# Patient Record
Sex: MALE | Race: WHITE | Employment: FULL TIME | URBAN - METROPOLITAN AREA
[De-identification: names, ages, dates, MRNs, and addresses within clinical notes are randomized per-mention and may not be internally consistent; named-entity substitution may affect disease eponyms.]

---

## 2017-04-07 ENCOUNTER — HOSPITAL ENCOUNTER (OUTPATIENT)
Dept: OTHER | Age: 42
Discharge: HOME | End: 2017-04-07
Attending: FAMILY MEDICINE | Admitting: FAMILY MEDICINE

## 2017-04-07 ENCOUNTER — OFFICE VISIT (OUTPATIENT)
Dept: FAMILY MEDICINE CLINIC | Age: 42
End: 2017-04-07

## 2017-04-07 VITALS
DIASTOLIC BLOOD PRESSURE: 62 MMHG | HEIGHT: 73 IN | OXYGEN SATURATION: 99 % | WEIGHT: 210 LBS | TEMPERATURE: 97.5 F | SYSTOLIC BLOOD PRESSURE: 104 MMHG | BODY MASS INDEX: 27.83 KG/M2 | HEART RATE: 59 BPM

## 2017-04-07 DIAGNOSIS — G47.33 OSA ON CPAP: ICD-10-CM

## 2017-04-07 DIAGNOSIS — Z00.00 WELL ADULT EXAM: Primary | ICD-10-CM

## 2017-04-07 DIAGNOSIS — R07.89 CHEST WALL PAIN: ICD-10-CM

## 2017-04-07 DIAGNOSIS — Z99.89 OSA ON CPAP: ICD-10-CM

## 2017-04-07 DIAGNOSIS — Z13.220 SCREENING FOR LIPID DISORDERS: ICD-10-CM

## 2017-04-07 PROCEDURE — 99395 PREV VISIT EST AGE 18-39: CPT | Performed by: FAMILY MEDICINE

## 2017-04-07 PROCEDURE — 36415 COLL VENOUS BLD VENIPUNCTURE: CPT | Performed by: FAMILY MEDICINE

## 2017-04-07 ASSESSMENT — ENCOUNTER SYMPTOMS
APNEA: 1
EYES NEGATIVE: 1
GASTROINTESTINAL NEGATIVE: 1

## 2017-04-08 LAB
CHOLESTEROL, TOTAL: 165 MG/DL (ref 0–199)
HDLC SERPL-MCNC: 42 MG/DL (ref 40–60)
LDL CHOLESTEROL CALCULATED: 107 MG/DL
TRIGL SERPL-MCNC: 82 MG/DL (ref 0–150)
VLDLC SERPL CALC-MCNC: 16 MG/DL

## 2022-03-14 ENCOUNTER — TELEPHONE (OUTPATIENT)
Dept: FAMILY MEDICINE CLINIC | Age: 47
End: 2022-03-14

## 2022-03-14 DIAGNOSIS — N63.15 BREAST LUMP ON RIGHT SIDE AT 3 O'CLOCK POSITION: Primary | ICD-10-CM

## 2022-03-14 NOTE — TELEPHONE ENCOUNTER
Pt has a lump in his right breast. States that it hurts a little been there for a couple days at least per wife.

## 2022-03-15 NOTE — TELEPHONE ENCOUNTER
Per central scheduling orders needed to be changed to bilateral mammogram and right breast ultra sound.  Orders have been placed

## 2022-03-18 ENCOUNTER — HOSPITAL ENCOUNTER (OUTPATIENT)
Dept: WOMENS IMAGING | Age: 47
Discharge: HOME | End: 2022-03-18
Payer: COMMERCIAL

## 2022-03-18 DIAGNOSIS — N63.15 BREAST LUMP ON RIGHT SIDE AT 3 O'CLOCK POSITION: ICD-10-CM

## 2022-03-18 PROCEDURE — 76642 ULTRASOUND BREAST LIMITED: CPT

## 2022-03-18 PROCEDURE — G0279 TOMOSYNTHESIS, MAMMO: HCPCS

## 2022-03-21 DIAGNOSIS — Z00.00 ANNUAL PHYSICAL EXAM: Primary | ICD-10-CM

## 2022-03-21 DIAGNOSIS — R68.82 LOW LIBIDO: ICD-10-CM

## 2022-03-21 DIAGNOSIS — Z13.220 SCREENING FOR LIPID DISORDERS: ICD-10-CM

## 2022-03-21 DIAGNOSIS — Z12.5 SCREENING FOR PROSTATE CANCER: ICD-10-CM

## 2022-03-24 ENCOUNTER — NURSE ONLY (OUTPATIENT)
Dept: FAMILY MEDICINE CLINIC | Age: 47
End: 2022-03-24
Payer: COMMERCIAL

## 2022-03-24 DIAGNOSIS — Z13.220 SCREENING FOR LIPID DISORDERS: ICD-10-CM

## 2022-03-24 DIAGNOSIS — R68.82 LOW LIBIDO: ICD-10-CM

## 2022-03-24 DIAGNOSIS — Z00.00 ANNUAL PHYSICAL EXAM: ICD-10-CM

## 2022-03-24 DIAGNOSIS — Z12.5 SCREENING FOR PROSTATE CANCER: ICD-10-CM

## 2022-03-24 LAB
ANION GAP SERPL CALCULATED.3IONS-SCNC: 13 MMOL/L (ref 3–16)
BASOPHILS ABSOLUTE: 0 K/UL (ref 0–0.2)
BASOPHILS RELATIVE PERCENT: 0.6 %
BUN BLDV-MCNC: 23 MG/DL (ref 7–20)
CALCIUM SERPL-MCNC: 9.2 MG/DL (ref 8.3–10.6)
CHLORIDE BLD-SCNC: 104 MMOL/L (ref 99–110)
CHOLESTEROL, TOTAL: 148 MG/DL (ref 0–199)
CO2: 22 MMOL/L (ref 21–32)
CREAT SERPL-MCNC: 1 MG/DL (ref 0.9–1.3)
EOSINOPHILS ABSOLUTE: 0.2 K/UL (ref 0–0.6)
EOSINOPHILS RELATIVE PERCENT: 3.5 %
GFR AFRICAN AMERICAN: >60
GFR NON-AFRICAN AMERICAN: >60
GLUCOSE BLD-MCNC: 89 MG/DL (ref 70–99)
HCT VFR BLD CALC: 42 % (ref 40.5–52.5)
HDLC SERPL-MCNC: 34 MG/DL (ref 40–60)
HEMOGLOBIN: 14.5 G/DL (ref 13.5–17.5)
LDL CHOLESTEROL CALCULATED: 95 MG/DL
LYMPHOCYTES ABSOLUTE: 2.2 K/UL (ref 1–5.1)
LYMPHOCYTES RELATIVE PERCENT: 35.3 %
MCH RBC QN AUTO: 29.5 PG (ref 26–34)
MCHC RBC AUTO-ENTMCNC: 34.4 G/DL (ref 31–36)
MCV RBC AUTO: 85.5 FL (ref 80–100)
MONOCYTES ABSOLUTE: 0.5 K/UL (ref 0–1.3)
MONOCYTES RELATIVE PERCENT: 7.3 %
NEUTROPHILS ABSOLUTE: 3.4 K/UL (ref 1.7–7.7)
NEUTROPHILS RELATIVE PERCENT: 53.3 %
PDW BLD-RTO: 13.7 % (ref 12.4–15.4)
PLATELET # BLD: 252 K/UL (ref 135–450)
PMV BLD AUTO: 8.5 FL (ref 5–10.5)
POTASSIUM SERPL-SCNC: 4.2 MMOL/L (ref 3.5–5.1)
PROSTATE SPECIFIC ANTIGEN: 0.92 NG/ML (ref 0–4)
RBC # BLD: 4.9 M/UL (ref 4.2–5.9)
SODIUM BLD-SCNC: 139 MMOL/L (ref 136–145)
TRIGL SERPL-MCNC: 97 MG/DL (ref 0–150)
VLDLC SERPL CALC-MCNC: 19 MG/DL
WBC # BLD: 6.3 K/UL (ref 4–11)

## 2022-03-24 PROCEDURE — 36415 COLL VENOUS BLD VENIPUNCTURE: CPT | Performed by: FAMILY MEDICINE

## 2022-03-28 ENCOUNTER — OFFICE VISIT (OUTPATIENT)
Dept: FAMILY MEDICINE CLINIC | Age: 47
End: 2022-03-28
Payer: COMMERCIAL

## 2022-03-28 VITALS
WEIGHT: 248.2 LBS | HEIGHT: 73 IN | BODY MASS INDEX: 32.89 KG/M2 | OXYGEN SATURATION: 96 % | SYSTOLIC BLOOD PRESSURE: 111 MMHG | DIASTOLIC BLOOD PRESSURE: 72 MMHG | HEART RATE: 71 BPM

## 2022-03-28 DIAGNOSIS — Z85.820 HISTORY OF MELANOMA: ICD-10-CM

## 2022-03-28 DIAGNOSIS — Z00.00 ANNUAL PHYSICAL EXAM: Primary | ICD-10-CM

## 2022-03-28 DIAGNOSIS — D17.1 LIPOMA OF TORSO: ICD-10-CM

## 2022-03-28 PROCEDURE — 99386 PREV VISIT NEW AGE 40-64: CPT | Performed by: FAMILY MEDICINE

## 2022-03-28 SDOH — ECONOMIC STABILITY: FOOD INSECURITY: WITHIN THE PAST 12 MONTHS, THE FOOD YOU BOUGHT JUST DIDN'T LAST AND YOU DIDN'T HAVE MONEY TO GET MORE.: NEVER TRUE

## 2022-03-28 SDOH — ECONOMIC STABILITY: FOOD INSECURITY: WITHIN THE PAST 12 MONTHS, YOU WORRIED THAT YOUR FOOD WOULD RUN OUT BEFORE YOU GOT MONEY TO BUY MORE.: NEVER TRUE

## 2022-03-28 ASSESSMENT — PATIENT HEALTH QUESTIONNAIRE - PHQ9
SUM OF ALL RESPONSES TO PHQ9 QUESTIONS 1 & 2: 0
2. FEELING DOWN, DEPRESSED OR HOPELESS: 0
SUM OF ALL RESPONSES TO PHQ QUESTIONS 1-9: 0
SUM OF ALL RESPONSES TO PHQ QUESTIONS 1-9: 0
1. LITTLE INTEREST OR PLEASURE IN DOING THINGS: 0
SUM OF ALL RESPONSES TO PHQ QUESTIONS 1-9: 0
SUM OF ALL RESPONSES TO PHQ QUESTIONS 1-9: 0

## 2022-03-28 ASSESSMENT — SOCIAL DETERMINANTS OF HEALTH (SDOH): HOW HARD IS IT FOR YOU TO PAY FOR THE VERY BASICS LIKE FOOD, HOUSING, MEDICAL CARE, AND HEATING?: NOT HARD AT ALL

## 2022-03-28 NOTE — PROGRESS NOTES
History and Physical      Genaro Clark  YOB: 1975    Date of Service:  3/28/2022    Chief Complaint:  Genaro Clark is a 42 y.o. male who presents for complete physical examination. HPI:     Patient states that he has not been using his CPAP at night, despite his diagnosis with sleep apnea. He states that he feels like he is sleeping well right now without the CPAP. Discussed with patient his lab results. Patient did have the mammogram completed and it came back as a lipoma. He does not plan to have it removed at this time    He admits to having a history of melanoma. Discussed importance of seeing a dermatologist. Will refer to a dermatologist.      Internal Administration   First Dose COVID-19, J&J, PF, 0.5 mL  06/17/2021   Second Dose           Last COVID Lab No results found for: SARS-COV-2, SARS-COV-2 RNA, SARS-COV-2, SARS-COV-2, SARS-COV-2 BY PCR, SARS-COV-2, SARS-COV-2, SARS-COV-2         [] Patient has completed an advance directive  [x] Patient has NOT completed an advanced directive  [] Patient has a documented healthcare surrogate  [x] Patient does NOT have a documented healthcare surrogate  [] Discussed the importance of establishing and updating an advanced directive. Patient has questions at this time and those were answered. [x] Discussed the importance of establishing and updating an advanced directive. Patient does NOT have questions at this time.     Discussed with: [x] Patient            [] Family             [] Other caregiver    Wt Readings from Last 3 Encounters:   03/28/22 248 lb 3.2 oz (112.6 kg)   04/07/17 210 lb (95.3 kg)   04/27/16 235 lb (106.6 kg)     BP Readings from Last 3 Encounters:   03/28/22 111/72   04/07/17 104/62   04/27/16 118/84     No results found for: LABA1C    Patient Active Problem List   Diagnosis    MIGUEL ANGEL on CPAP       Preventive Care:  Health Maintenance   Topic Date Due    Hepatitis C screen  Never done    Depression Screen  Never done  HIV screen  Never done    COVID-19 Vaccine (2 - Booster for [employer] series) 2021    Lipid screen  2027    DTaP/Tdap/Td vaccine (2 - Td or Tdap) 2031    Flu vaccine  Completed    Hepatitis A vaccine  Aged Out    Hepatitis B vaccine  Aged Out    Hib vaccine  Aged Out    Meningococcal (ACWY) vaccine  Aged Out    Pneumococcal 0-64 years Vaccine  Aged Out        Lipid panel:    Lab Results   Component Value Date    CHOL 148 2022    TRIG 97 2022    HDL 34 (L) 2022    [address] 95 2022       Living will: no,   Advance Care Planning addressed with patient today    Immunization History   Administered Date(s) Administered    COVID-19, J&J, PF, 0.5 mL 2021    Hepatitis B 2021    Influenza Virus Vaccine 2021    Influenza, MDCK Quadv, IM, PF (Flucelvax 2 yrs and older) 2021    Td vaccine (adult) 2009    Td, unspecified formulation 2009    Tdap (Boostrix, Adacel) 2021       No Known Allergies  No outpatient medications have been marked as taking for the 3/28/22 encounter (Office Visit) with Savita Delarosa MD.       Past Medical History:   Diagnosis Date    Melanoma ([facility] [address] 2017    MIGUEL ANGEL on CPAP      Past Surgical History:   Procedure Laterality Date    LIPOMA RESECTION       Family History   Problem Relation Age of Onset    Other Mother         ptca at age 56; 1948;brian bp;   Wilhelminia Blazing Other Father         imp glu jonathan; 1947    No Known Problems Sister      Social History     Socioeconomic History    Marital status:      Spouse name: Not on file    Number of children: Not on file    Years of education: Not on file    Highest education level: Not on file   Occupational History    Not on file   Tobacco Use    Smoking status: Former Smoker     Packs/day: 0.75     Years: 18.00     Pack years: 13.50     Types: Cigarettes     Quit date: 2016     Years since quittin.7    Smokeless tobacco: Never Used Vaping Use    Vaping Use: Never used   Substance and Sexual Activity    Alcohol use: No     Alcohol/week: 0.0 standard drinks    Drug use: No    Sexual activity: Yes     Partners: Female   Other Topics Concern    Not on file   Social History Narrative    4/2017 lives with spouse and 2 children;work for railroad;long hours;needs fmla in 4/2017 for cpap titration     Social Determinants of Health     Financial Resource Strain: Low Risk     Difficulty of Paying Living Expenses: Not hard at all   Food Insecurity: No Food Insecurity    Worried About [email or web address] in the Last Year: Never true    [address]  [employer] in the Last Year: Never true   Transportation Needs:     Lack of Transportation (Medical): Not on file    Lack of Transportation (Non-Medical): Not on file   Physical Activity:     Days of Exercise per Week: Not on file    Minutes of Exercise per Session: Not on file   Stress:     Feeling of Stress : Not on file   Social Connections:     Frequency of Communication with Friends and Family: Not on file    Frequency of Social Gatherings with Friends and Family: Not on file    Attends Druze Services: Not on file    Active Member of [address] [place] [address] or Organizations: Not on file    Attends Club or Organization Meetings: Not on file    Marital Status: Not on file   Intimate Partner Violence:     Fear of Current or Ex-Partner: Not on file    Emotionally Abused: Not on file    Physically Abused: Not on file    Sexually Abused: Not on file   Housing Stability:     Unable to Pay for Housing in the Last Year: Not on file    Number of [place] in the Last Year: Not on file    Unstable Housing in the Last Year: Not on file       Review of Systems:  A comprehensive review of systems was negative except for what was noted in the HPI. Constitutional: No fatigue or weight loss. Respiratory: No cough or dyspnea. Cardiovascular: No chest pain or edema. Gastrointestinal: No constipation or diarrhea. Additional review of systems may be scanned into the media section of this medical record. Any responses requiring further intervention were pursued. Physical Exam:   Vitals:    03/28/22 1301   BP: 111/72   Pulse: 71   SpO2: 96%   Weight: 248 lb 3.2 oz (112.6 kg)   Height: 6' 1\" (1.854 m)     Body mass index is 32.75 kg/m². Constitutional: He is oriented to person, place, and time. He appears well-developed and well-nourished. No distress. HEENT:   Head: Normocephalic and atraumatic. Right Ear: Tympanic membrane, external ear and ear canal normal.   Left Ear: Tympanic membrane, external ear and ear canal normal.   Nose: Nose normal.   Mouth/Throat: Oropharynx is clear and moist and mucous membranes are normal. No oropharyngeal exudate or posterior oropharyngeal erythema. He has no cervical adenopathy. Eyes: Conjunctivae and extraocular motions are normal. Pupils are equal, round, and reactive to light. Neck: Full passive range of motion without pain. Neck supple. No JVD present. Carotid bruit is not present. No mass and no thyromegaly present. Cardiovascular: Normal rate, regular rhythm, normal heart sounds and intact distal pulses. Exam reveals no gallop and no friction rub. No murmur heard. Pulmonary/Chest: Effort normal and breath sounds normal. No respiratory distress. He has no wheezes, rhonchi or rales. Abdominal: Soft, non-tender. Bowel sounds and aorta are normal. There is no organomegaly, mass or bruit. Musculoskeletal: Normal range of motion, no synovitis. He exhibits no edema. Neurological: He is alert and oriented to person, place, and time. He has normal reflexes. No cranial nerve deficit. Coordination normal.   Skin: Skin is warm, dry and intact. No suspicious lesions are noted. Torso examined with scattered cherry angiomas and benign nevi noted. Multiple lipomas over the torso newest over the medial right breast.  No right supraclavicular or axillary lymphadenopathy.   No head or neck lymphadenopathy. Psychiatric: He has a normal mood and affect. His speech is normal and behavior is normal. Judgment, cognition and memory are normal.     Assessment/Plan:     Diagnosis Orders   1. Annual physical exam     2. History of melanoma  [facility], DO, Dermatology, [facility]   3. Lipoma of torso     Discuss colon cancer screening next year. Refer to dermatology for annual skin exam with a history of melanoma. I believe the lipomas are benign, mammogram already of the right breast benign, no other intervention necessary at this time. Follow-up 1 year. Patient should call the office immediately with new or ongoing signs or symptoms or worsening, or proceed to the emergency room. No changes in past medical history, past surgical history, social history, or family history were noted during the patient encounter unless specifically listed above. All updates of past medical history, past surgical history, social history, or family history were reviewed personally by me during the office visit. All problems listed in the assessment are stable unless noted otherwise. Medication profile reviewed personally by me during the visit. Medication side effects and possible impairments from medications were discussed as applicable. This document was prepared by a combination of typing and transcription through a voice recognition software. Scribe attestation: Marin Metcalf RN, am scribing for and in the presence of Cydne Bosworth, MD. Electronically signed by Arie Mascorro RN on 3/28/2022 at 1:09 PM      Provider attestation:     I, Dr. Charis Meckel, personally performed the services described in this documentation, as scribed by the above signed scribe in my presence, and it is both accurate and complete.  I agree with the ROS and Past Histories independently gathered by the clinical support staff and the remaining scribed note accurately describes my personal service to the patient.       3/28/2022    1:24 PM

## 2022-03-28 NOTE — PATIENT INSTRUCTIONS

## 2022-03-29 LAB
SEX HORMONE BINDING GLOBULIN: 12 NMOL/L (ref 11–80)
TESTOSTERONE FREE-NONMALE: 90.6 PG/ML (ref 47–244)
TESTOSTERONE TOTAL: 295 NG/DL (ref 220–1000)

## 2022-04-28 ENCOUNTER — OFFICE VISIT (OUTPATIENT)
Dept: ORTHOPEDIC SURGERY | Age: 47
End: 2022-04-28
Payer: COMMERCIAL

## 2022-04-28 VITALS — HEIGHT: 73 IN | WEIGHT: 248 LBS | BODY MASS INDEX: 32.87 KG/M2

## 2022-04-28 DIAGNOSIS — S83.232A COMPLEX TEAR OF MEDIAL MENISCUS OF LEFT KNEE AS CURRENT INJURY, INITIAL ENCOUNTER: ICD-10-CM

## 2022-04-28 DIAGNOSIS — M25.562 LEFT KNEE PAIN, UNSPECIFIED CHRONICITY: Primary | ICD-10-CM

## 2022-04-28 PROCEDURE — 99203 OFFICE O/P NEW LOW 30 MIN: CPT | Performed by: PHYSICIAN ASSISTANT

## 2022-04-28 PROCEDURE — L1812 KO ELASTIC W/JOINTS PRE OTS: HCPCS | Performed by: PHYSICIAN ASSISTANT

## 2022-04-28 NOTE — PROGRESS NOTES
Chief Complaint  Knee Pain (left knee pain)      History of Present Illness:  Katy Khan is a 42 y.o. male who has recently joined the [employer]. Last Friday he was completing and obstacle course activity which required a lot of jumping and climbing over walls. After the exercise was complete, he noticed pain to the medial aspect of his left knee. He denies specific injury. The pain worsened throughout the weekend. He completed another exercise at the [employer] where he had a lot of difficulty with jumping. He describes the pain as sharp and deep. He feels like the knee is unstable and may give out. Pain Assessment  Location of Pain: Knee  Location Modifiers: Left  Severity of Pain: 7  Quality of Pain: Aching,Sharp  Duration of Pain: Persistent  Frequency of Pain: Constant  Aggravating Factors: Bending,Walking  Limiting Behavior: Yes  Relieving Factors: Rest  Result of Injury: Yes  Work-Related Injury: No  Are there other pain locations you wish to document?: No         Medical History     Patient's medications, allergies, past medical, surgical, social and family histories were reviewed and updated as appropriate. Review of Systems  Pertinent items are noted in HPI  Review of systems reviewed from Patient History Form dated on 4/28/22 and available in the patient's chart under the Media tab. Vital Signs  There were no vitals filed for this visit. General Exam:     Constitutional: Patient is adequately groomed with no evidence of malnutrition  DTRs: Deep tendon reflexes are intact  Mental Status: The patient is oriented to time, place and person. The patient's mood and affect are appropriate. Lymphatic: The lymphatic examination bilaterally reveals all areas to be without enlargement or induration. Vascular: Examination reveals no swelling or calf tenderness. Peripheral pulses are palpable and 2+. Neurological: The patient has good coordination.   There is no weakness or sensory deficit. Knee Examination  Inspection:  No obvious deformities. No ecchymosis or erythema. Palpation:  Painful to palpation over the medial tibia. Non tender on palpation to the rest of the knee exam including patella, fibular head and along the joint     Rang of Motion:  Range of motion is not limited in any direction tested. Strength:  5/5 quad and hamstring strength. Special Tests:  Lachman's -, Danis's +, varus and valgus stress -    Skin: There are no rashes, ulcerations or lesions. Distal neurovascular exam is intact. Gait: Fluid smooth gait    Reflex symmetrically preserved      Additional Examinations:  Right Lower Extremity: Examination of the right lower extremity does not show any tenderness, deformity or injury. Range of motion is unremarkable. There is no gross instability. There are no rashes, ulcerations or lesions. Strength and tone are normal.      Diagnostic Test Findings: Xrays obtained in the after hours clinic including 4 views of the left knee and 2 comparison views of the right demonstrate no obvious osseous abnormalities. There are no fractures or dislocations. There is no narrowing of joint spaces. Assessment :  Possible medial meniscus tear. MCL strain. Impression:  Encounter Diagnosis   Name Primary?  Left knee pain, unspecified chronicity Yes       Office Procedures:  Orders Placed This Encounter   Procedures    XR KNEE LEFT (MIN 4 VIEWS)     Standing Status:   Future     Number of Occurrences:   1     Standing Expiration Date:   5/28/2022       Treatment Plan:  Treatment options were discussed with Rosangela Dimas. He would like to proceed with MRI imaging of his left knee to rule out a meniscus tear. We will provide him with a note to sit out of the [employer] for one week at which time he will follow up with Dr. Gustavo Baez. He was also provided with an economy hinge knee brace to provide stability while ambulating.      All questions were answered to the best of my ability during this encounter. Analia Felix PA-C          This dictation was performed with a verbal recognition program (DRAGON) and it was checked for errors. It is possible that there are still dictated errors within this office note. If so, please bring any errors to my attention for an addendum. All efforts were made to ensure that this office note is accurate. Addendum: I saw and evaluated the patient myself. I agree with the history and physical exam noted above. On exam today the patient is found to have a moderate effusion of the left knee on exam.  The patient is tender over the course of his MCL with pain elicited with valgus stress testing but joint does not appear unstable today despite some guarding. He does have a positive McMurrays on the medial side today as well. Negative Anterior Drawer test.  Calf soft. At this time we will plan to get MRI to further evaluate especially given the demands of his current occupation. Following MRI he will f/u with Dr Gustavo Baez.      Cheng Mc PA-C

## 2022-04-29 ENCOUNTER — TELEPHONE (OUTPATIENT)
Dept: ORTHOPEDIC SURGERY | Age: 47
End: 2022-04-29

## 2022-04-29 NOTE — TELEPHONE ENCOUNTER
General Question     Subject: PATIENT WAS SEEN IN THE [facility] [address] [place] [address] ON Thursday, April 28, 2022. 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 HE CANNOT RUN AND JUMP. PATIENT WOULD LIKE NOTE TO BE PUT INTO HIS MY CHART. PLEASE ADVISE.     Patient:  Araceli Manuel [address] [place] [address] Road Number: [phone]

## 2022-04-29 NOTE — TELEPHONE ENCOUNTER
Called patient to let them know that their MRI has been authorized and that they can call and schedule scan at their convenience. Also told them that they can call and schedule a f/u with Dr. Shay Rodriguez once they have MRI scheduled, leaving at least 2-3 days for our office to receive their results.

## 2022-05-07 ENCOUNTER — HOSPITAL ENCOUNTER (OUTPATIENT)
Dept: MRI IMAGING | Age: 47
Discharge: HOME | End: 2022-05-07
Payer: COMMERCIAL

## 2022-05-07 DIAGNOSIS — S83.232A COMPLEX TEAR OF MEDIAL MENISCUS OF LEFT KNEE AS CURRENT INJURY, INITIAL ENCOUNTER: ICD-10-CM

## 2022-05-07 PROCEDURE — 73721 MRI JNT OF LWR EXTRE W/O DYE: CPT

## 2022-05-11 ENCOUNTER — TELEPHONE (OUTPATIENT)
Dept: ORTHOPEDIC SURGERY | Age: 47
End: 2022-05-11

## 2022-05-11 NOTE — TELEPHONE ENCOUNTER
General Question     Subject: PATIENT REQUESTING MRI TEST RESULTS VIRTUALLY.  PATIENT WAS SEEN AT [address] [place] [address] 4/28/22 PLEASE ADVISE   Patient Gracie Mcdonald  Contact Number: [phone]

## 2022-05-11 NOTE — TELEPHONE ENCOUNTER
SPOKE TO JOSEFINA SETHI, MANAGER AT [facility]. EXPLAINED SITUATION AND ASKED HOW DR. CUNNINGHAM WOULD LIKE TO PROCEED WITH THIS PATIENT. JOSEFINA IS GOING TO CONTACT PATIENT TO GET HIM SCHEDULED FOR AN APPOINTMENT.